# Patient Record
Sex: MALE | Race: WHITE | Employment: FULL TIME | URBAN - METROPOLITAN AREA
[De-identification: names, ages, dates, MRNs, and addresses within clinical notes are randomized per-mention and may not be internally consistent; named-entity substitution may affect disease eponyms.]

---

## 2020-10-30 ENCOUNTER — OFFICE VISIT (OUTPATIENT)
Dept: SURGERY | Facility: CLINIC | Age: 60
End: 2020-10-30
Payer: COMMERCIAL

## 2020-10-30 VITALS
WEIGHT: 205 LBS | DIASTOLIC BLOOD PRESSURE: 70 MMHG | HEART RATE: 58 BPM | SYSTOLIC BLOOD PRESSURE: 132 MMHG | BODY MASS INDEX: 29.35 KG/M2 | HEIGHT: 70 IN

## 2020-10-30 DIAGNOSIS — K42.9 UMBILICAL HERNIA WITHOUT OBSTRUCTION AND WITHOUT GANGRENE: Primary | ICD-10-CM

## 2020-10-30 PROCEDURE — 99204 OFFICE O/P NEW MOD 45 MIN: CPT | Performed by: SURGERY

## 2020-10-30 RX ORDER — ASPIRIN 325 MG
325 TABLET ORAL DAILY
COMMUNITY
End: 2020-12-02

## 2020-10-30 RX ORDER — RAMIPRIL 10 MG/1
10 CAPSULE ORAL DAILY
COMMUNITY

## 2020-10-30 RX ORDER — METOPROLOL TARTRATE 50 MG
50 TABLET ORAL 2 TIMES DAILY
COMMUNITY
End: 2020-12-02

## 2020-10-30 RX ORDER — HYDROCHLOROTHIAZIDE 12.5 MG/1
12.5 TABLET ORAL DAILY
COMMUNITY
End: 2020-12-02

## 2020-10-30 RX ORDER — ATORVASTATIN CALCIUM 80 MG/1
80 TABLET, FILM COATED ORAL DAILY
COMMUNITY

## 2020-10-30 ASSESSMENT — MIFFLIN-ST. JEOR: SCORE: 1741.12

## 2020-10-30 NOTE — PROGRESS NOTES
Assessment:    Bradford Alba is a 60 year old male is seen in consultation for a hernia, at the request of Arian Winston MD.    Primary, reducible umbilical hernia.    Plan:    We have discussed observation, reduction techniques and importance, incarceration and strangulation signs, symptoms and importance as well as need to seek emergency treatment.      We have discussed open umbilical hernia repair with mesh in detail, including benefits, alternatives, complications, incision, scar, mesh, infection, anesthesia, bleeding, blood transfusion, DVT, PE, hernia recurrence, lifting and activity limits after surgery.  All questions have been answered to the best of my ability.    He has been given literature to review.   We will schedule surgery at the patient's convenience.    Recommended time off work postop:  1 wks  Recommended time off lifting 20 lb:   3 wks      HPI:  Bradford Alba is a 60 year old male who presents for evaluation of a lump in the kirsten-umbilical region.      Duration:  4 years   Pain/quality:  No / not too bothersome  Inciting event:  No  Exacerbating factors:  prolonged standing    Nausea/vomitting/bloating:  No    Previous surgery in this location:  No     Previous herniorrhaphy:  No       Cough: No  Diabetes: No  Current Smoker: No    Heavy lifting > 20 lb: No     Past Medical History:   has no past medical history on file.    Past Surgical History:  Past Surgical History:   Procedure Laterality Date     TONSILLECTOMY          Social History:  Social History     Socioeconomic History     Marital status:      Spouse name: Not on file     Number of children: Not on file     Years of education: Not on file     Highest education level: Not on file   Occupational History     Not on file   Social Needs     Financial resource strain: Not on file     Food insecurity     Worry: Not on file     Inability: Not on file     Transportation needs     Medical: Not on file     Non-medical: Not on file  "  Tobacco Use     Smoking status: Never Smoker     Smokeless tobacco: Never Used   Substance and Sexual Activity     Alcohol use: Yes     Comment: 2 per day      Drug use: Not on file     Sexual activity: Not on file   Lifestyle     Physical activity     Days per week: Not on file     Minutes per session: Not on file     Stress: Not on file   Relationships     Social connections     Talks on phone: Not on file     Gets together: Not on file     Attends [employer] service: Not on file     Active member of club or organization: Not on file     Attends meetings of clubs or organizations: Not on file     Relationship status: Not on file     Intimate partner violence     Fear of current or ex partner: Not on file     Emotionally abused: Not on file     Physically abused: Not on file     Forced sexual activity: Not on file   Other Topics Concern     Not on file   Social History Narrative     Not on file        Family History:  History reviewed. No pertinent family history.  Family history reviewed and not pertinent.    ROS:  The 10 point review of systems is negative other than noted in the HPI and above.    PE:    Vitals: /70   Pulse 58   Ht 1.778 m (5' 10\")   Wt 93 kg (205 lb)   BMI 29.41 kg/m    BMI= Body mass index is 29.41 kg/m .  General - Well developed, well nourished male in no apparent distress  HEENT:  Head normocephalic and atraumatic, pupils equal and round, conjunctivae clear, no scleral icterus, mucous membranes moist, external ears and nose normal  Neck: Supple without thyromegaly or masses  Lymphatic: No cervical, or supraclavicular lymphadenopathy  Lungs: Clear to auscultation bilaterally  Heart: Regular rate and rhythm, no murmurs  Abdomen:  abdomen is soft without significant tenderness, masses, organomegaly or guarding   Hernia:  umbilical, 4 cm, reducible, mildly tender  Extremities: Warm without edema  Musculoskeletal:  Normal station and gait  Neurologic: alert, speech is clear, moves all " extremities with good strength  Psychiatric: Mood and affect appropriate  Skin: Without lesions or rashes, or juandice    This note was created using voice recognition software. Undetected word substitutions or other errors may have occurred.     Time spent with the patient with greater that 50% of the time in discussion was 15 minutes.     Theo Huizar MD    Please route or send letter to: Dr. Arian MARTINEZ

## 2020-11-04 PROBLEM — K42.9 UMBILICAL HERNIA WITHOUT OBSTRUCTION AND WITHOUT GANGRENE: Status: ACTIVE | Noted: 2020-11-04

## 2020-11-06 ENCOUNTER — TELEPHONE (OUTPATIENT)
Dept: SURGERY | Facility: CLINIC | Age: 60
End: 2020-11-06

## 2020-11-06 NOTE — TELEPHONE ENCOUNTER
Type of surgery: open umbilical hernia repair  Location of surgery: [facility]  Date and time of surgery: 12/3/20 9:25am  Surgeon: Dr Huizar  Pre-Op Appt Date: pt to schedule  Post-Op Appt Date: pt to schedule   Packet sent out: Yes  Pre-cert/Authorization completed:  Not Applicable  Date: 11/5/20    MAC anesthesia

## 2020-11-07 DIAGNOSIS — Z11.59 ENCOUNTER FOR SCREENING FOR OTHER VIRAL DISEASES: Primary | ICD-10-CM

## 2020-12-01 DIAGNOSIS — Z11.59 ENCOUNTER FOR SCREENING FOR OTHER VIRAL DISEASES: ICD-10-CM

## 2020-12-01 PROCEDURE — U0003 INFECTIOUS AGENT DETECTION BY NUCLEIC ACID (DNA OR RNA); SEVERE ACUTE RESPIRATORY SYNDROME CORONAVIRUS 2 (SARS-COV-2) (CORONAVIRUS DISEASE [COVID-19]), AMPLIFIED PROBE TECHNIQUE, MAKING USE OF HIGH THROUGHPUT TECHNOLOGIES AS DESCRIBED BY CMS-2020-01-R: HCPCS | Performed by: SURGERY

## 2020-12-02 LAB
LABORATORY COMMENT REPORT: NORMAL
SARS-COV-2 RNA SPEC QL NAA+PROBE: NEGATIVE
SARS-COV-2 RNA SPEC QL NAA+PROBE: NORMAL
SPECIMEN SOURCE: NORMAL
SPECIMEN SOURCE: NORMAL

## 2020-12-02 RX ORDER — ASPIRIN 81 MG/1
81 TABLET ORAL DAILY
COMMUNITY

## 2020-12-02 RX ORDER — HYDROCHLOROTHIAZIDE 12.5 MG/1
25 TABLET ORAL DAILY
COMMUNITY

## 2020-12-02 RX ORDER — METOPROLOL SUCCINATE 50 MG/1
50 TABLET, EXTENDED RELEASE ORAL DAILY
COMMUNITY

## 2020-12-03 ENCOUNTER — ANESTHESIA EVENT (OUTPATIENT)
Dept: SURGERY | Facility: CLINIC | Age: 60
End: 2020-12-03
Payer: COMMERCIAL

## 2020-12-03 ENCOUNTER — SURGERY (OUTPATIENT)
Age: 60
End: 2020-12-03
Payer: COMMERCIAL

## 2020-12-03 ENCOUNTER — APPOINTMENT (OUTPATIENT)
Dept: SURGERY | Facility: PHYSICIAN GROUP | Age: 60
End: 2020-12-03
Payer: COMMERCIAL

## 2020-12-03 ENCOUNTER — HOSPITAL ENCOUNTER (OUTPATIENT)
Facility: CLINIC | Age: 60
Discharge: HOME | End: 2020-12-03
Attending: SURGERY | Admitting: SURGERY
Payer: COMMERCIAL

## 2020-12-03 ENCOUNTER — ANESTHESIA (OUTPATIENT)
Dept: SURGERY | Facility: CLINIC | Age: 60
End: 2020-12-03
Payer: COMMERCIAL

## 2020-12-03 VITALS
HEART RATE: 49 BPM | DIASTOLIC BLOOD PRESSURE: 73 MMHG | TEMPERATURE: 97.4 F | HEIGHT: 70 IN | SYSTOLIC BLOOD PRESSURE: 111 MMHG | WEIGHT: 215.8 LBS | OXYGEN SATURATION: 95 % | BODY MASS INDEX: 30.9 KG/M2 | RESPIRATION RATE: 14 BRPM

## 2020-12-03 DIAGNOSIS — G89.18 POST-OP PAIN: Primary | ICD-10-CM

## 2020-12-03 PROCEDURE — 258N000003 HC RX IP 258 OP 636: Performed by: ANESTHESIOLOGY

## 2020-12-03 PROCEDURE — 360N000015 HC SURGERY LEVEL 2 EA 15 ADDTL MIN: Performed by: SURGERY

## 2020-12-03 PROCEDURE — 761N000001 HC RECOVERY PHASE 1 LEVEL 1 FIRST HR: Performed by: SURGERY

## 2020-12-03 PROCEDURE — 999N000139 HC STATISTIC PRE-PROCEDURE ASSESSMENT II: Performed by: SURGERY

## 2020-12-03 PROCEDURE — 272N000001 HC OR GENERAL SUPPLY STERILE: Performed by: SURGERY

## 2020-12-03 PROCEDURE — 370N000002 HC ANESTHESIA TECHNICAL FEE, EACH ADDTL 15 MIN: Performed by: SURGERY

## 2020-12-03 PROCEDURE — 258N000003 HC RX IP 258 OP 636: Performed by: NURSE ANESTHETIST, CERTIFIED REGISTERED

## 2020-12-03 PROCEDURE — 49585 PR REPAIR UMBILICAL HERN,5+Y/O,REDUC: CPT | Performed by: SURGERY

## 2020-12-03 PROCEDURE — 49585 PR REPAIR UMBILICAL HERN,5+Y/O,REDUC: CPT | Mod: AS | Performed by: PHYSICIAN ASSISTANT

## 2020-12-03 PROCEDURE — 250N000009 HC RX 250: Performed by: SURGERY

## 2020-12-03 PROCEDURE — 250N000009 HC RX 250: Performed by: NURSE ANESTHETIST, CERTIFIED REGISTERED

## 2020-12-03 PROCEDURE — 250N000011 HC RX IP 250 OP 636: Performed by: NURSE ANESTHETIST, CERTIFIED REGISTERED

## 2020-12-03 PROCEDURE — C1781 MESH (IMPLANTABLE): HCPCS | Performed by: SURGERY

## 2020-12-03 PROCEDURE — 360N000014 HC SURGERY LEVEL 2 1ST 30 MIN: Performed by: SURGERY

## 2020-12-03 PROCEDURE — 370N000001 HC ANESTHESIA TECHNICAL FEE, 1ST 30 MIN: Performed by: SURGERY

## 2020-12-03 PROCEDURE — 250N000011 HC RX IP 250 OP 636: Performed by: SURGERY

## 2020-12-03 PROCEDURE — 761N000007 HC RECOVERY PHASE 2 EACH 15 MINS: Performed by: SURGERY

## 2020-12-03 PROCEDURE — 250N000013 HC RX MED GY IP 250 OP 250 PS 637: Performed by: PHYSICIAN ASSISTANT

## 2020-12-03 DEVICE — MESH VENTRALEX HERNIA 2.5" CIRCLE MED W/STRAP 5950008: Type: IMPLANTABLE DEVICE | Site: ABDOMEN | Status: FUNCTIONAL

## 2020-12-03 RX ORDER — NALOXONE HYDROCHLORIDE 0.4 MG/ML
0.4 INJECTION, SOLUTION INTRAMUSCULAR; INTRAVENOUS; SUBCUTANEOUS
Status: DISCONTINUED | OUTPATIENT
Start: 2020-12-03 | End: 2020-12-03 | Stop reason: HOSPADM

## 2020-12-03 RX ORDER — FENTANYL CITRATE 50 UG/ML
INJECTION, SOLUTION INTRAMUSCULAR; INTRAVENOUS PRN
Status: DISCONTINUED | OUTPATIENT
Start: 2020-12-03 | End: 2020-12-03

## 2020-12-03 RX ORDER — BUPIVACAINE HYDROCHLORIDE AND EPINEPHRINE 2.5; 5 MG/ML; UG/ML
INJECTION, SOLUTION EPIDURAL; INFILTRATION; INTRACAUDAL; PERINEURAL
Status: DISCONTINUED
Start: 2020-12-03 | End: 2020-12-03 | Stop reason: HOSPADM

## 2020-12-03 RX ORDER — ONDANSETRON 4 MG/1
4 TABLET, ORALLY DISINTEGRATING ORAL EVERY 30 MIN PRN
Status: DISCONTINUED | OUTPATIENT
Start: 2020-12-03 | End: 2020-12-03 | Stop reason: HOSPADM

## 2020-12-03 RX ORDER — MEPERIDINE HYDROCHLORIDE 25 MG/ML
12.5 INJECTION INTRAMUSCULAR; INTRAVENOUS; SUBCUTANEOUS
Status: DISCONTINUED | OUTPATIENT
Start: 2020-12-03 | End: 2020-12-03 | Stop reason: HOSPADM

## 2020-12-03 RX ORDER — HYDROCODONE BITARTRATE AND ACETAMINOPHEN 5; 325 MG/1; MG/1
1-2 TABLET ORAL EVERY 4 HOURS PRN
Qty: 16 TABLET | Refills: 0 | Status: SHIPPED | OUTPATIENT
Start: 2020-12-03

## 2020-12-03 RX ORDER — CEFAZOLIN SODIUM 2 G/100ML
2 INJECTION, SOLUTION INTRAVENOUS
Status: COMPLETED | OUTPATIENT
Start: 2020-12-03 | End: 2020-12-03

## 2020-12-03 RX ORDER — PROPOFOL 10 MG/ML
INJECTION, EMULSION INTRAVENOUS CONTINUOUS PRN
Status: DISCONTINUED | OUTPATIENT
Start: 2020-12-03 | End: 2020-12-03

## 2020-12-03 RX ORDER — AMOXICILLIN 250 MG
1 CAPSULE ORAL 2 TIMES DAILY
Qty: 15 TABLET | Refills: 0 | Status: SHIPPED | OUTPATIENT
Start: 2020-12-03 | End: 2020-12-10

## 2020-12-03 RX ORDER — EPINEPHRINE 1 MG/ML
INJECTION, SOLUTION INTRAMUSCULAR; SUBCUTANEOUS
Status: DISCONTINUED
Start: 2020-12-03 | End: 2020-12-03 | Stop reason: HOSPADM

## 2020-12-03 RX ORDER — SODIUM CHLORIDE, SODIUM LACTATE, POTASSIUM CHLORIDE, CALCIUM CHLORIDE 600; 310; 30; 20 MG/100ML; MG/100ML; MG/100ML; MG/100ML
INJECTION, SOLUTION INTRAVENOUS CONTINUOUS PRN
Status: DISCONTINUED | OUTPATIENT
Start: 2020-12-03 | End: 2020-12-03

## 2020-12-03 RX ORDER — SODIUM CHLORIDE, SODIUM LACTATE, POTASSIUM CHLORIDE, CALCIUM CHLORIDE 600; 310; 30; 20 MG/100ML; MG/100ML; MG/100ML; MG/100ML
INJECTION, SOLUTION INTRAVENOUS CONTINUOUS
Status: DISCONTINUED | OUTPATIENT
Start: 2020-12-03 | End: 2020-12-03 | Stop reason: HOSPADM

## 2020-12-03 RX ORDER — LIDOCAINE HYDROCHLORIDE 10 MG/ML
INJECTION, SOLUTION EPIDURAL; INFILTRATION; INTRACAUDAL; PERINEURAL
Status: DISCONTINUED
Start: 2020-12-03 | End: 2020-12-03 | Stop reason: HOSPADM

## 2020-12-03 RX ORDER — NALOXONE HYDROCHLORIDE 0.4 MG/ML
0.2 INJECTION, SOLUTION INTRAMUSCULAR; INTRAVENOUS; SUBCUTANEOUS
Status: DISCONTINUED | OUTPATIENT
Start: 2020-12-03 | End: 2020-12-03 | Stop reason: HOSPADM

## 2020-12-03 RX ORDER — ONDANSETRON 2 MG/ML
4 INJECTION INTRAMUSCULAR; INTRAVENOUS EVERY 30 MIN PRN
Status: DISCONTINUED | OUTPATIENT
Start: 2020-12-03 | End: 2020-12-03 | Stop reason: HOSPADM

## 2020-12-03 RX ORDER — CEFAZOLIN SODIUM 1 G/3ML
1 INJECTION, POWDER, FOR SOLUTION INTRAMUSCULAR; INTRAVENOUS SEE ADMIN INSTRUCTIONS
Status: DISCONTINUED | OUTPATIENT
Start: 2020-12-03 | End: 2020-12-03 | Stop reason: HOSPADM

## 2020-12-03 RX ORDER — FENTANYL CITRATE 50 UG/ML
25-50 INJECTION, SOLUTION INTRAMUSCULAR; INTRAVENOUS
Status: DISCONTINUED | OUTPATIENT
Start: 2020-12-03 | End: 2020-12-03 | Stop reason: HOSPADM

## 2020-12-03 RX ORDER — HYDROMORPHONE HYDROCHLORIDE 1 MG/ML
.3-.5 INJECTION, SOLUTION INTRAMUSCULAR; INTRAVENOUS; SUBCUTANEOUS EVERY 10 MIN PRN
Status: DISCONTINUED | OUTPATIENT
Start: 2020-12-03 | End: 2020-12-03 | Stop reason: HOSPADM

## 2020-12-03 RX ORDER — PROPOFOL 10 MG/ML
INJECTION, EMULSION INTRAVENOUS PRN
Status: DISCONTINUED | OUTPATIENT
Start: 2020-12-03 | End: 2020-12-03

## 2020-12-03 RX ORDER — BUPIVACAINE HYDROCHLORIDE 5 MG/ML
INJECTION, SOLUTION EPIDURAL; INTRACAUDAL
Status: DISCONTINUED
Start: 2020-12-03 | End: 2020-12-03 | Stop reason: HOSPADM

## 2020-12-03 RX ORDER — DEXAMETHASONE SODIUM PHOSPHATE 4 MG/ML
INJECTION, SOLUTION INTRA-ARTICULAR; INTRALESIONAL; INTRAMUSCULAR; INTRAVENOUS; SOFT TISSUE PRN
Status: DISCONTINUED | OUTPATIENT
Start: 2020-12-03 | End: 2020-12-03

## 2020-12-03 RX ORDER — HYDROCODONE BITARTRATE AND ACETAMINOPHEN 5; 325 MG/1; MG/1
1 TABLET ORAL
Status: COMPLETED | OUTPATIENT
Start: 2020-12-03 | End: 2020-12-03

## 2020-12-03 RX ORDER — ONDANSETRON 2 MG/ML
INJECTION INTRAMUSCULAR; INTRAVENOUS PRN
Status: DISCONTINUED | OUTPATIENT
Start: 2020-12-03 | End: 2020-12-03

## 2020-12-03 RX ADMIN — CEFAZOLIN SODIUM 2 G: 2 INJECTION, SOLUTION INTRAVENOUS at 09:57

## 2020-12-03 RX ADMIN — PROPOFOL 75 MCG/KG/MIN: 10 INJECTION, EMULSION INTRAVENOUS at 09:58

## 2020-12-03 RX ADMIN — PROPOFOL 50 MG: 10 INJECTION, EMULSION INTRAVENOUS at 09:56

## 2020-12-03 RX ADMIN — FENTANYL CITRATE 100 MCG: 50 INJECTION, SOLUTION INTRAMUSCULAR; INTRAVENOUS at 10:05

## 2020-12-03 RX ADMIN — ONDANSETRON 4 MG: 2 INJECTION INTRAMUSCULAR; INTRAVENOUS at 10:12

## 2020-12-03 RX ADMIN — SODIUM CHLORIDE, POTASSIUM CHLORIDE, SODIUM LACTATE AND CALCIUM CHLORIDE: 600; 310; 30; 20 INJECTION, SOLUTION INTRAVENOUS at 09:55

## 2020-12-03 RX ADMIN — SODIUM CHLORIDE, POTASSIUM CHLORIDE, SODIUM LACTATE AND CALCIUM CHLORIDE: 600; 310; 30; 20 INJECTION, SOLUTION INTRAVENOUS at 11:30

## 2020-12-03 RX ADMIN — BUPIVACAINE HYDROCHLORIDE AND EPINEPHRINE BITARTRATE 26 ML: 5; .005 INJECTION, SOLUTION EPIDURAL; INTRACAUDAL; PERINEURAL at 10:35

## 2020-12-03 RX ADMIN — HYDROCODONE BITARTRATE AND ACETAMINOPHEN 1 TABLET: 5; 325 TABLET ORAL at 11:12

## 2020-12-03 RX ADMIN — DEXAMETHASONE SODIUM PHOSPHATE 4 MG: 4 INJECTION, SOLUTION INTRA-ARTICULAR; INTRALESIONAL; INTRAMUSCULAR; INTRAVENOUS; SOFT TISSUE at 10:12

## 2020-12-03 RX ADMIN — MIDAZOLAM 2 MG: 1 INJECTION INTRAMUSCULAR; INTRAVENOUS at 09:55

## 2020-12-03 RX ADMIN — DEXMEDETOMIDINE HYDROCHLORIDE 0.4 MCG/KG/HR: 100 INJECTION, SOLUTION INTRAVENOUS at 09:55

## 2020-12-03 ASSESSMENT — MIFFLIN-ST. JEOR: SCORE: 1790.11

## 2020-12-03 ASSESSMENT — ENCOUNTER SYMPTOMS
DYSRHYTHMIAS: 0
SEIZURES: 0

## 2020-12-03 ASSESSMENT — COPD QUESTIONNAIRES: COPD: 0

## 2020-12-03 ASSESSMENT — LIFESTYLE VARIABLES: TOBACCO_USE: 0

## 2020-12-03 NOTE — OP NOTE
General Surgery Operative Note    PREOPERATIVE DIAGNOSIS:  Umbilical hernia without obstruction and without gangrene [K42.9]    POSTOPERATIVE DIAGNOSIS:  Same    PROCEDURE:   Procedure(s):  OPEN UMBILICAL HERNIA REPAIR with mesh    ANESTHESIA:  General.      SURGEON:  Theo Huizar MD    ASSISTANT:  Mireya Mcclellan PA-C. The physician assistant was medically necessary for their expertise in  suctioning, and retraction    INDICATIONS:  The patient has an umbilical hernia that is bothersome. The risks and options were reviewed. The patient appeared to understand and wished to proceed.    PROCEDURE:  The patient was taken to the operating suite.  The operative area was prepped and draped in a sterile fashion.  Surgeon initiated timeout was acknowledged.   A kirsten-umbilical incision was made after infiltrating with local anesthetic. The hernia was dissected away from the skin and subcutaneous tissues. The hernia contents were reduced. A retro-fascial plane was developed in all directions for several centimeters under direct vision. A 6.2cm piece of bilayer mesh was selected. 2-0 PDS sutures were placed at the 12, 3, 6, and 9 O'clock positions going through fascia, mesh, and back up through fascia. The mesh was folded and inserted into the retrofascial position. The sutures were tightened and the mesh was checked to make certain it deployed correctly. The sutures were then tied. The fascia was close primarily with interrupted 1 Prolene sutures, burying the knots. Sponge count was done and was reported as correct. The umbilicus was re-inverted with interrupted 3-0 vicryl. Further anesthetic was infiltrated and the skin closed with 4-0 vicryl and steri-strips.    .  The patient was  awakened and taken to the PACU in stable condition.  At the conclusion of the case, all counts were correct.        INTRAOPERATIVE FINDINGS:  Umbilical hernia, reducible    Theo Huizar MD

## 2020-12-03 NOTE — OR NURSING
PNDS met, po per I&O sheet. Pt dressed, up in recliner and transported to Phase 2. Must void before discharge to home.

## 2020-12-03 NOTE — ANESTHESIA PREPROCEDURE EVALUATION
Anesthesia Pre-Procedure Evaluation    Patient: Bradford Alba   MRN: [id number] : 1960          Preoperative Diagnosis: Umbilical hernia without obstruction and without gangrene [K42.9]    Procedure(s):  OPEN UMBILICAL HERNIA REPAIR    Past Medical History:   Diagnosis Date     Colon adenoma      Elevated glucose      Gastroesophageal reflux disease      Hypertension      Male erectile dysfunction      Obese      Quadriceps muscle strain      Sleep apnea     uses CPAP     Umbilical hernia      Warts      Past Surgical History:   Procedure Laterality Date     COLONOSCOPY       GI SURGERY      EGD     TONSILLECTOMY      and adenoidectomy       Anesthesia Evaluation     .             ROS/MED HX    ENT/Pulmonary:     (+)sleep apnea, uses CPAP , . .   (-) tobacco use, asthma and COPD   Neurologic:      (-) seizures, CVA and TIA   Cardiovascular:     (+) Dyslipidemia, hypertension----. : . . . :. . Previous cardiac testing date:results:date: results:ECG reviewed date:2020 results:SB at 54 bpm date: results:         (-) CAD, CHF and arrhythmias   METS/Exercise Tolerance:     Hematologic:         Musculoskeletal:         GI/Hepatic:     (+) GERD Asymptomatic on medication,       Renal/Genitourinary:         Endo: Comment: BMI 32    (+) Obesity, .   (-) Type I DM and Type II DM   Psychiatric:         Infectious Disease:         Malignancy:         Other:                          Physical Exam  Normal systems: dental    Airway   Mallampati: II  TM distance: >3 FB  Neck ROM: full    Dental     Cardiovascular   Rhythm and rate: regular      Pulmonary    breath sounds clear to auscultation            No results found for: WBC, HGB, HCT, PLT, CRP, SED, NA, POTASSIUM, CHLORIDE, CO2, BUN, CR, GLC, ANGIE, PHOS, MAG, ALBUMIN, PROTTOTAL, ALT, AST, GGT, ALKPHOS, BILITOTAL, BILIDIRECT, LIPASE, AMYLASE, JASMYNE, PTT, INR, FIBR, TSH, T4, T3, HCG, HCGS, CKTOTAL, CKMB, TROPN    Preop Vitals  BP Readings from Last 3 Encounters:  "  10/30/20 132/70    Pulse Readings from Last 3 Encounters:   10/30/20 58      Resp Readings from Last 3 Encounters:   No data found for Resp    SpO2 Readings from Last 3 Encounters:   No data found for SpO2      Temp Readings from Last 1 Encounters:   No data found for Temp    Ht Readings from Last 1 Encounters:   10/30/20 1.778 m (5' 10\")      Wt Readings from Last 1 Encounters:   10/30/20 93 kg (205 lb)    Estimated body mass index is 29.41 kg/m  as calculated from the following:    Height as of 10/30/20: 1.778 m (5' 10\").    Weight as of 10/30/20: 93 kg (205 lb).       Anesthesia Plan      History & Physical Review  History and physical reviewed and following examination; no interval change.    ASA Status:  2 .    NPO Status:  > 8 hours    Plan for MAC   PONV prophylaxis:  Ondansetron (or other 5HT-3) and Dexamethasone or Solumedrol         Postoperative Care  Postoperative pain management:  Multi-modal analgesia.      Consents  Anesthetic plan, risks, benefits and alternatives discussed with:  Patient..                 Anuel Love MD  "

## 2020-12-03 NOTE — ANESTHESIA CARE TRANSFER NOTE
Patient: Bradford Alba    Procedure(s):  OPEN UMBILICAL HERNIA REPAIR with mesh    Diagnosis: Umbilical hernia without obstruction and without gangrene [K42.9]  Diagnosis Additional Information: No value filed.    Anesthesia Type:   MAC     Note:  Airway :Room Air  Patient transferred to:PACU  Handoff Report: Identifed the Patient, Identified the Reponsible Provider, Reviewed the pertinent medical history, Discussed the surgical course, Reviewed Intra-OP anesthesia mangement and issues during anesthesia, Set expectations for post-procedure period and Allowed opportunity for questions and acknowledgement of understanding      Vitals: (Last set prior to Anesthesia Care Transfer)    CRNA VITALS  12/3/2020 1015 - 12/3/2020 1050      12/3/2020             Resp Rate (set):  10                Electronically Signed By: CHARLETTE Phillips CRNA  December 3, 2020  10:50 AM

## 2020-12-03 NOTE — DISCHARGE INSTRUCTIONS
Same Day Surgery Discharge Instructions for  Sedation and General Anesthesia       It's not unusual to feel dizzy, light-headed or faint for up to 24 hours after surgery or while taking pain medication.  If you have these symptoms: sit for a few minutes before standing and have someone assist you when you get up to walk or use the bathroom.      You should rest and relax for the next 24 hours. We recommend you make arrangements to have an adult stay with you for at least 24 hours after your discharge.  Avoid hazardous and strenuous activity.      DO NOT DRIVE any vehicle or operate mechanical equipment for 24 hours following the end of your surgery.  Even though you may feel normal, your reactions may be affected by the medication you have received.      Do not drink alcoholic beverages for 24 hours following surgery.       Slowly progress to your regular diet as you feel able. It's not unusual to feel nauseated and/or vomit after receiving anesthesia.  If you develop these symptoms, drink clear liquids (apple juice, ginger ale, broth, 7-up, etc. ) until you feel better.  If your nausea and vomiting persists for 24 hours, please notify your surgeon.        All narcotic pain medications, along with inactivity and anesthesia, can cause constipation. Drinking plenty of liquids and increasing fiber intake will help.      For any questions of a medical nature, call your surgeon.      Do not make important decisions for 24 hours.      If you had general anesthesia, you may have a sore throat for a couple of days related to the breathing tube used during surgery.  You may use Cepacol lozenges to help with this discomfort.  If it worsens or if you develop a fever, contact your surgeon.       If you feel your pain is not well managed with the pain medications prescribed by your surgeon, please contact your surgeon's office to let them know so they can address your concerns.       CoVid 19 Information    We want to give  you information regarding Covid. Please consult your primary care provider with any questions you might have.     Patient who have symptoms (cough, fever, or shortness of breath), need to isolate for 7 days from when symptoms started OR 72 hours after fever resolves (without fever reducing medications) AND improvement of respiratory symptoms (whichever is longer).      Isolate yourself at home (in own room/own bathroom if possible)    Do Not allow any visitors    Do Not go to work or school    Do Not go to Islam,  centers, shopping, or other public places.    Do Not shake hands.    Avoid close and intimate contact with others (hugging, kissing).    Follow CDC recommendations for household cleaning of frequently touched services.     After the initial 7 days, continue to isolate yourself from household members as much as possible. To continue decrease the risk of community spread and exposure, you and any members of your household should limit activities in public for 14 days after starting home isolation.     You can reference the following CDC link for helpful home isolation/care tips:  https://www.cdc.gov/coronavirus/2019-ncov/downloads/10Things.pdf    Protect Others:    Cover Your Mouth and Nose with a mask, disposable tissue or wash cloth to avoid spreading germs to others.    Wash your hands and face frequently with soap and water    Call Your Primary Doctor If: Breathing difficulty develops or you become worse.    For more information about COVID19 and options for caring for yourself at home, please visit the CDC website at https://www.cdc.gov/coronavirus/2019-ncov/about/steps-when-sick.html  For more options for care at [facility], please visit our website at [email or web address]      **Because you had anesthesia today and your history of sleep apnea, it is extremely important that you use your CPAP machine for the next 24 hours while napping or  sleeping.**          [facility]  Discharge Instructions: Post-Operative Open Umbilical Hernia Repair    ACTIVITY    Take frequent, short walks and increase your activity gradually.      Avoid strenuous physical activity or heavy lifting greater than 15lbs. for 3-4 weeks.  You may climb stairs.    You may drive without restrictions when you are not using any prescription pain medication and feel comfortable in a car.    You may return to work/school when you are comfortable without any prescription pain medication.    WOUND CARE    You may remove your bandage and shower 48 hours after the surgery.  Pat your incision dry and leave it open to air.  Re-apply dressing (Band-Aids or gauze/tape) as needed for comfort or drainage.    You may have steri-strips (looks like white tape) on your incision.  You may peel off the steri-strips 2 weeks after your surgery if they have not peeled off on their own.     Do not soak your incision in a tub or pool for 2 weeks.     Do not apply any lotions, creams, or ointments to your incision.    A ridge under your incision is normal and will gradually resolve.    DIET    Start with liquids, then gradually resume your regular diet as tolerated.     Drink plenty of fluids to stay hydrated.    PAIN    Expect some tenderness and discomfort at the incision site(s).  Use the prescribed pain medication at your discretion.  Expect gradual resolution of your pain over several days.    You may take ibuprofen with food (unless you have been told not to) instead of or in addition to your prescribed pain medication.  If you are taking Norco or Percocet, do not take any additional acetaminophen/Tylenol.    Do not drink alcohol or drive while you are taking pain medications.    You may apply ice to your incisions in 20 minute intervals as needed for the next 48 hours.  After that time, consider switching to heat if you prefer.    EXPECTATIONS    Pain medications can cause  constipation.  Limit use when possible.  Take over the counter stool softener/stimulant, such as Colace or Senna, 1-2 times a day with plenty of water.  You may take a mild over the counter laxative, such as Miralax or a suppository, as needed.  You may take 1 oz. (2 tablespoons) Milk of Magnesia the evening following surgery to encourage bowel movement.    You may discontinue these medications once you are having regular bowel movements and/or are no longer taking your narcotic pain medication.      FOLLOW UP    Our office will contact you in approximately 2 weeks to check on your progress and answer any questions you may have.  If you are doing well, you will not need to return for a follow up appointment.  If any concerns are identified over the phone, we will help you make an appointment to see a provider.     If you have not received a phone call, have any questions or concerns, or would like to be seen, please call us at [phone] and ask to speak with our nurse.  We are located at [address] [place] [address].    CALL OUR OFFICE -[phone] IF YOU HAVE:     Chills or fever above 101 F.    Increased redness, warmth, or drainage at your incisions.    Significant bleeding.    Pain not relieved by your pain medication or rest.    Increasing pain after the first 48 hours.    Any other concerns or questions.           Revised September 2020        **If you have questions or concerns about your procedure,   call Dr Huizar at  [phone]**

## 2020-12-04 NOTE — ANESTHESIA POSTPROCEDURE EVALUATION
Patient: Bradford Alba    Procedure(s):  OPEN UMBILICAL HERNIA REPAIR with mesh    Diagnosis:Umbilical hernia without obstruction and without gangrene [K42.9]  Diagnosis Additional Information: No value filed.    Anesthesia Type:  MAC    Note:  Anesthesia Post Evaluation    Patient location during evaluation: Bedside  Patient participation: Able to fully participate in evaluation  Level of consciousness: awake and alert  Pain management: adequate  Airway patency: patent  Cardiovascular status: acceptable  Respiratory status: acceptable  Hydration status: acceptable  PONV: none             Last vitals:  Vitals:    12/03/20 1130 12/03/20 1145 12/03/20 1255   BP: 96/60 104/65 111/73   Pulse: (!) 49 (!) 49    Resp: 17 9 14   Temp: 36.5  C (97.7  F) 36.3  C (97.4  F)    SpO2: 92% 94% 95%         Electronically Signed By: Anuel Love MD  December 4, 2020  12:49 PM

## 2020-12-17 ENCOUNTER — TELEPHONE (OUTPATIENT)
Dept: SURGERY | Facility: CLINIC | Age: 60
End: 2020-12-17

## 2020-12-17 NOTE — TELEPHONE ENCOUNTER
[facility]  Post op call note - No Answer    Bradford RAIN Parth was called for an update regarding his recovery.  There was no answer and a message was left instructing the patient to call the office with any questions or concerns.     Mireya Mcclellan PA-C

## (undated) DEVICE — BLADE CLIPPER 4406

## (undated) DEVICE — SOL WATER IRRIG 1000ML BOTTLE 2F7114

## (undated) DEVICE — DRAPE LAP W/ARMBOARD 29410

## (undated) DEVICE — GLOVE GAMMEX DERMAPRENE ULTRA SZ 8.5 LF 8517

## (undated) DEVICE — SU PROLENE 1 CTX 30" 8435H

## (undated) DEVICE — PACK MINOR SBA15MIFSE

## (undated) DEVICE — SU VICRYL 3-0 SH 27" J316H

## (undated) DEVICE — DECANTER VIAL 2006S

## (undated) DEVICE — SU PDS II 2-0 CT-2 27"  Z333H

## (undated) DEVICE — SPONGE TONSIL W/O STRING 5PK LATEX

## (undated) DEVICE — DRSG STERI STRIP 1/2X4" R1547

## (undated) DEVICE — PREP CHLORAPREP 26ML TINTED ORANGE  260815

## (undated) DEVICE — ESU PENCIL W/SMOKE EVAC CVPLP2000

## (undated) DEVICE — LINEN TOWEL PACK X5 5464

## (undated) DEVICE — ESU GROUND PAD UNIVERSAL W/O CORD

## (undated) DEVICE — SOL NACL 0.9% IRRIG 1000ML BOTTLE 2F7124

## (undated) RX ORDER — HYDROCODONE BITARTRATE AND ACETAMINOPHEN 5; 325 MG/1; MG/1
TABLET ORAL
Status: DISPENSED
Start: 2020-12-03

## (undated) RX ORDER — CEFAZOLIN SODIUM 2 G/100ML
INJECTION, SOLUTION INTRAVENOUS
Status: DISPENSED
Start: 2020-12-03

## (undated) RX ORDER — FENTANYL CITRATE 50 UG/ML
INJECTION, SOLUTION INTRAMUSCULAR; INTRAVENOUS
Status: DISPENSED
Start: 2020-12-03

## (undated) RX ORDER — LIDOCAINE HYDROCHLORIDE 20 MG/ML
INJECTION, SOLUTION EPIDURAL; INFILTRATION; INTRACAUDAL; PERINEURAL
Status: DISPENSED
Start: 2020-12-03

## (undated) RX ORDER — PROPOFOL 10 MG/ML
INJECTION, EMULSION INTRAVENOUS
Status: DISPENSED
Start: 2020-12-03

## (undated) RX ORDER — DEXAMETHASONE SODIUM PHOSPHATE 4 MG/ML
INJECTION, SOLUTION INTRA-ARTICULAR; INTRALESIONAL; INTRAMUSCULAR; INTRAVENOUS; SOFT TISSUE
Status: DISPENSED
Start: 2020-12-03

## (undated) RX ORDER — ONDANSETRON 2 MG/ML
INJECTION INTRAMUSCULAR; INTRAVENOUS
Status: DISPENSED
Start: 2020-12-03